# Patient Record
Sex: FEMALE | Race: WHITE | Employment: FULL TIME | URBAN - METROPOLITAN AREA
[De-identification: names, ages, dates, MRNs, and addresses within clinical notes are randomized per-mention and may not be internally consistent; named-entity substitution may affect disease eponyms.]

---

## 2022-07-01 RX ORDER — OMEPRAZOLE/SODIUM BICARBONATE 20MG-1.1G
CAPSULE ORAL
COMMUNITY

## 2022-07-01 RX ORDER — ASPIRIN 81 MG/1
TABLET, CHEWABLE ORAL
COMMUNITY

## 2023-10-27 ENCOUNTER — OFFICE VISIT (OUTPATIENT)
Age: 58
End: 2023-10-27

## 2023-10-27 VITALS
WEIGHT: 126.6 LBS | HEART RATE: 68 BPM | BODY MASS INDEX: 25.52 KG/M2 | SYSTOLIC BLOOD PRESSURE: 138 MMHG | DIASTOLIC BLOOD PRESSURE: 78 MMHG | HEIGHT: 59 IN

## 2023-10-27 DIAGNOSIS — I10 PRIMARY HYPERTENSION: Primary | ICD-10-CM

## 2023-10-27 DIAGNOSIS — E78.2 MIXED HYPERLIPIDEMIA: ICD-10-CM

## 2023-10-27 DIAGNOSIS — Z86.73 H/O TIA (TRANSIENT ISCHEMIC ATTACK) AND STROKE: ICD-10-CM

## 2023-10-27 DIAGNOSIS — Z82.49 FAMILY HISTORY OF CORONARY ARTERY DISEASE: ICD-10-CM

## 2023-10-27 RX ORDER — EZETIMIBE 10 MG/1
10 TABLET ORAL DAILY
Qty: 90 TABLET | Refills: 3 | Status: SHIPPED | OUTPATIENT
Start: 2023-10-27 | End: 2024-10-21

## 2023-10-27 RX ORDER — METOPROLOL SUCCINATE 25 MG/1
37.5 TABLET, EXTENDED RELEASE ORAL NIGHTLY
Qty: 135 TABLET | Refills: 3 | Status: SHIPPED | OUTPATIENT
Start: 2023-10-27

## 2023-10-27 RX ORDER — ROSUVASTATIN CALCIUM 40 MG/1
40 TABLET, COATED ORAL EVERY EVENING
Qty: 90 TABLET | Refills: 3 | Status: SHIPPED | OUTPATIENT
Start: 2023-10-27 | End: 2024-10-21

## 2023-10-27 ASSESSMENT — ENCOUNTER SYMPTOMS
ABDOMINAL PAIN: 0
HOARSE VOICE: 0
STRIDOR: 0
HEMOPTYSIS: 0
HEMATOCHEZIA: 0
EYE REDNESS: 0
HEMATEMESIS: 0
WHEEZING: 0
DOUBLE VISION: 0

## 2023-10-27 NOTE — PROGRESS NOTES
[facility] CARDIOLOGY  [address] [facility], [place]  PHONE: [phone]          10/27/23    NAME:  Cyn Jarvis  : 1965  MRN: [id number]         SUBJECTIVE:   Cyn Jarvis is a 59 y.o. female seen for a visit regarding the following:     Chief Complaint   Patient presents with    New Patient    Hyperlipidemia    Other     Family history of CAD           HPI:    Cardiac problem list:  1. Family history of coronary artery disease  -nuclear stress test from 2023 showed normal perfusion, EF 82%-achieved 13.4 METS. -Father also has a diagnosis of amyloidosis. 2.  Hypertension  3. Hyperlipidemia  4. History of TIA - not truly this- stress related  5. LAFB  -Previous cardiologist-Dr. Marko Green    Dear Dr. Shira Greenfield,  I saw Ms. Natanael Jorgensen who is a pleasant 68-year-old woman in cardiovascular consultation for a family history of coronary artery disease, hypertension, hyperlipidemia and history of TIA. She used to follow with a previous cardiologist in [place]. Family history of CAD-she remains very active. No complaints of any angina. No significant worsening dyspnea on exertion orthopnea PND. No lower extremity edema. Hypertension: Denies headaches blurry vision and remains compliant on her current therapy. Takes metoprolol succinate a total of 37.5 mg daily. Hyperlipidemia-remains on high intensity statin therapy with rosuvastatin 40 mg every evening and Zetia 10 mg once daily. Tolerating these well with no significant myalgias. Atorvastatin in the past did cause myalgias. History of TIA-not truly consistent with a real TIA-had spikes in blood pressures that cause symptoms transiently. No neurological symptoms lately. Left anterior fascicular block-noted on EKG-asymptomatic with no complaints associated with syncope in any way.         Past Medical History, Past Surgical History, Family history, Social History, and Medications were all

## 2024-10-10 ENCOUNTER — OFFICE VISIT (OUTPATIENT)
Age: 59
End: 2024-10-10
Payer: COMMERCIAL

## 2024-10-10 VITALS
HEART RATE: 62 BPM | DIASTOLIC BLOOD PRESSURE: 82 MMHG | BODY MASS INDEX: 26.25 KG/M2 | HEIGHT: 59 IN | SYSTOLIC BLOOD PRESSURE: 120 MMHG | WEIGHT: 130.2 LBS

## 2024-10-10 DIAGNOSIS — Z82.49 FAMILY HISTORY OF CORONARY ARTERY DISEASE: ICD-10-CM

## 2024-10-10 DIAGNOSIS — I10 PRIMARY HYPERTENSION: Primary | ICD-10-CM

## 2024-10-10 DIAGNOSIS — Z86.73 H/O TIA (TRANSIENT ISCHEMIC ATTACK) AND STROKE: ICD-10-CM

## 2024-10-10 DIAGNOSIS — E78.2 MIXED HYPERLIPIDEMIA: ICD-10-CM

## 2024-10-10 PROCEDURE — G8419 CALC BMI OUT NRM PARAM NOF/U: HCPCS | Performed by: INTERNAL MEDICINE

## 2024-10-10 PROCEDURE — 3079F DIAST BP 80-89 MM HG: CPT | Performed by: INTERNAL MEDICINE

## 2024-10-10 PROCEDURE — G8484 FLU IMMUNIZE NO ADMIN: HCPCS | Performed by: INTERNAL MEDICINE

## 2024-10-10 PROCEDURE — 1036F TOBACCO NON-USER: CPT | Performed by: INTERNAL MEDICINE

## 2024-10-10 PROCEDURE — G8428 CUR MEDS NOT DOCUMENT: HCPCS | Performed by: INTERNAL MEDICINE

## 2024-10-10 PROCEDURE — 3074F SYST BP LT 130 MM HG: CPT | Performed by: INTERNAL MEDICINE

## 2024-10-10 PROCEDURE — 93000 ELECTROCARDIOGRAM COMPLETE: CPT | Performed by: INTERNAL MEDICINE

## 2024-10-10 PROCEDURE — 99214 OFFICE O/P EST MOD 30 MIN: CPT | Performed by: INTERNAL MEDICINE

## 2024-10-10 PROCEDURE — 3017F COLORECTAL CA SCREEN DOC REV: CPT | Performed by: INTERNAL MEDICINE

## 2024-10-10 RX ORDER — EZETIMIBE 10 MG/1
10 TABLET ORAL DAILY
Qty: 90 TABLET | Refills: 3 | Status: SHIPPED | OUTPATIENT
Start: 2024-10-10 | End: 2025-10-05

## 2024-10-10 RX ORDER — METOPROLOL SUCCINATE 25 MG/1
37.5 TABLET, EXTENDED RELEASE ORAL NIGHTLY
Qty: 135 TABLET | Refills: 3 | Status: SHIPPED | OUTPATIENT
Start: 2024-10-10

## 2024-10-10 RX ORDER — ROSUVASTATIN CALCIUM 40 MG/1
40 TABLET, COATED ORAL EVERY EVENING
Qty: 90 TABLET | Refills: 3 | Status: SHIPPED | OUTPATIENT
Start: 2024-10-10 | End: 2025-10-05

## 2024-10-10 RX ORDER — DICLOFENAC SODIUM 75 MG/1
75 TABLET, DELAYED RELEASE ORAL DAILY
COMMUNITY
Start: 2024-09-26

## 2024-10-10 ASSESSMENT — ENCOUNTER SYMPTOMS
STRIDOR: 0
HEMOPTYSIS: 0
HEMATOCHEZIA: 0
HEMATEMESIS: 0
HOARSE VOICE: 0
WHEEZING: 0
ABDOMINAL PAIN: 0
DOUBLE VISION: 0
EYE REDNESS: 0

## 2024-10-10 NOTE — PROGRESS NOTES
[facility] CARDIOLOGY  [address] [place] [address], [address]  [place] [address]  PHONE: [phone]          10/10/24    NAME:  Taryn Moya  : 1965  MRN: [id number]         SUBJECTIVE:   Taryn Moya is a 59 y.o. female seen for a visit regarding the following:     Chief Complaint   Patient presents with    Annual Exam    Hypertension           HPI:    Cardiac problem list:  1.  Family history of coronary artery disease  -nuclear stress test from 2023 showed normal perfusion, EF 82%-achieved 13.4 METS.  -Father also has a diagnosis of amyloidosis.  2.  Hypertension  3.  Hyperlipidemia  4.  History of TIA - not truly this- stress related  5. LAFB  -Previous cardiologist-Dr. Dagoberto Kimbrough-[facility]    Dear Dr. Solitario,  I saw Ms. Moya who is a pleasant 58-year-old woman in cardiovascular follow-up for a family history of coronary artery disease, hypertension, hyperlipidemia and history of TIA.  She used to follow with a previous cardiologist in [place].    We last met with her a year ago at which point she had done well from a cardiac perspective.  We had previously put her through a nuclear stress test which showed no evidence of inducible ischemia from 2023.    Family history of CAD-she remains very active.  No complaints of any angina.  No significant worsening dyspnea on exertion orthopnea PND.  No lower extremity edema.    Hypertension: Denies headaches blurry vision and remains compliant on her current therapy.  Takes metoprolol succinate a total of 37.5 mg daily.    Hyperlipidemia-remains on high intensity statin therapy with rosuvastatin 40 mg every evening and Zetia 10 mg once daily.  Tolerating these well with no significant myalgias.  Atorvastatin in the past did cause myalgias.    History of TIA-not truly consistent with a real TIA-had spikes in blood pressures that cause symptoms transiently.  No neurological symptoms lately.  Mother had advanced carotid artery disease